# Patient Record
Sex: MALE | Race: WHITE | NOT HISPANIC OR LATINO | Employment: OTHER | URBAN - METROPOLITAN AREA
[De-identification: names, ages, dates, MRNs, and addresses within clinical notes are randomized per-mention and may not be internally consistent; named-entity substitution may affect disease eponyms.]

---

## 2021-06-14 ENCOUNTER — OFFICE VISIT (OUTPATIENT)
Dept: SLEEP MEDICINE | Facility: HOSPITAL | Age: 46
End: 2021-06-14

## 2021-06-14 VITALS
SYSTOLIC BLOOD PRESSURE: 144 MMHG | OXYGEN SATURATION: 97 % | BODY MASS INDEX: 40.32 KG/M2 | WEIGHT: 297.7 LBS | HEIGHT: 72 IN | DIASTOLIC BLOOD PRESSURE: 82 MMHG | HEART RATE: 73 BPM

## 2021-06-14 DIAGNOSIS — E66.01 OBESITY, MORBID, BMI 40.0-49.9 (HCC): ICD-10-CM

## 2021-06-14 DIAGNOSIS — R06.83 SNORING: ICD-10-CM

## 2021-06-14 DIAGNOSIS — G47.33 OSA (OBSTRUCTIVE SLEEP APNEA): Primary | ICD-10-CM

## 2021-06-14 DIAGNOSIS — G47.19 EXCESSIVE DAYTIME SLEEPINESS: ICD-10-CM

## 2021-06-14 PROBLEM — J30.2 SEASONAL RHINITIS: Status: ACTIVE | Noted: 2021-06-14

## 2021-06-14 PROCEDURE — 99204 OFFICE O/P NEW MOD 45 MIN: CPT | Performed by: INTERNAL MEDICINE

## 2021-06-14 RX ORDER — MELOXICAM 15 MG/1
TABLET ORAL
COMMUNITY
Start: 2021-04-26

## 2021-06-14 RX ORDER — FLUTICASONE PROPIONATE 50 MCG
SPRAY, SUSPENSION (ML) NASAL
COMMUNITY
Start: 2021-04-26

## 2021-06-14 RX ORDER — MONTELUKAST SODIUM 10 MG/1
TABLET ORAL
COMMUNITY
Start: 2021-04-26

## 2021-06-14 NOTE — PROGRESS NOTES
"  Jennifer Negrete is a 45 y.o. male.   Chief Complaint   Patient presents with   • Sleeping Problem       HPI     45 y.o. male seen for the above    He has a multiple year history of snoring.  His wife indicated the problem has been going on for 3 or 4 years but he clearly notes that he had some type of overnight study 7 years ago.  This was some type of home study and could have been an overnight oximetry.  He does not recall any other specifics.  He was told that it was okay.    He has begun noticing daytime somnolence.  He will not offer the first time in his life at times.  He will sometimes awaken himself with difficulty breathing and also has started awakening with morning headaches.    His wife notes snoring as well as apneas.  She notes that he snores primarily in his back and his right side.  She notes no other unusual activities at night.    He has gained about 15-20 pounds in the last several years.    He recalls awakening twice per night on average.    [place] Scale is: 6/24    The patient's relevant past medical, surgical, family, and social history reviewed and updated in Epic as appropriate.    Current medications are:   Current Outpatient Medications:   •  fluticasone (FLONASE) 50 MCG/ACT nasal spray, , Disp: , Rfl:   •  meloxicam (MOBIC) 15 MG tablet, , Disp: , Rfl:   •  montelukast (SINGULAIR) 10 MG tablet, , Disp: , Rfl: .    Review of Systems    Review of Systems  ROS documented in patient questionnaire ×14 systems.  Reviewed with patient.  Otherwise negative except as noted in HPI.    Physical Exam    Blood pressure 144/82, pulse 73, height 182.9 cm (72\"), weight 135 kg (297 lb 11.2 oz), SpO2 97 %. Body mass index is 40.38 kg/m².    Physical Exam  Vitals and nursing note reviewed.   Constitutional:       Appearance: Normal appearance. He is well-developed.   HENT:      Head: Normocephalic and atraumatic.      Nose: Nose normal.      Mouth/Throat:      Mouth: Mucous membranes are moist.      " Pharynx: Oropharynx is clear. No oropharyngeal exudate.      Comments: Class IV airway  Eyes:      General: No scleral icterus.     Conjunctiva/sclera: Conjunctivae normal.   Neck:      Thyroid: No thyromegaly.      Trachea: No tracheal deviation.   Cardiovascular:      Rate and Rhythm: Normal rate and regular rhythm.      Heart sounds: No murmur heard.   No friction rub. No gallop.    Pulmonary:      Effort: Pulmonary effort is normal. No respiratory distress.      Breath sounds: No wheezing or rales.   Musculoskeletal:         General: No deformity. Normal range of motion.   Skin:     General: Skin is warm and dry.      Findings: No rash.   Neurological:      Mental Status: He is alert and oriented to person, place, and time.   Psychiatric:         Behavior: Behavior normal.         Thought Content: Thought content normal.         DATA:    Reviewed note from Fabricio Snell MD dated 11/28/2016    Reviewed bed partner questionnaire    ASSESSMENT:    Problem List Items Addressed This Visit        Pulmonary Problems    NILDA (obstructive sleep apnea) - Primary    Relevant Orders    Home Sleep Study    Snoring    Relevant Orders    Home Sleep Study       Other    Excessive daytime sleepiness    Relevant Orders    Home Sleep Study    Obesity, morbid, BMI 40.0-49.9 (CMS/[facility])          45-year-old male with increasing excessive daytime somnolence, morning headaches, snoring, and witnessed apneas.  He likely has undiagnosed obstructive sleep apnea.  He is at risk from the standpoint of his Mallampati class IV airway and BMI of 40.    PLAN:    1. Home sleep apnea testing is appropriate in his case  2. I discussed the diagnostic process as well as treatment options  3. I went over the association between untreated obstructive sleep apnea and long-term problems with metabolic and cardiovascular disease  4. We discussed the benefits of long-term weight loss  5. He was amenable to trial of CPAP therapy if deemed appropriate and  he is familiar with this therapy.  6. Close sleep center follow-up    I have reviewed the results of my evaluation and impression and discussed my recommendations in detail with the patient.    Level of Risk Moderate due to: undiagnosed new problem    Signed by  Beck Clements MD    June 14, 2021      CC: Provider, No Known          No ref. provider found

## 2021-07-12 ENCOUNTER — HOSPITAL ENCOUNTER (OUTPATIENT)
Dept: SLEEP MEDICINE | Facility: HOSPITAL | Age: 46
Discharge: HOME | End: 2021-07-12
Admitting: INTERNAL MEDICINE

## 2021-07-12 VITALS — BODY MASS INDEX: 40.23 KG/M2 | HEIGHT: 72 IN | WEIGHT: 297 LBS

## 2021-07-12 DIAGNOSIS — G47.33 OSA (OBSTRUCTIVE SLEEP APNEA): ICD-10-CM

## 2021-07-12 DIAGNOSIS — G47.19 EXCESSIVE DAYTIME SLEEPINESS: ICD-10-CM

## 2021-07-12 DIAGNOSIS — R06.83 SNORING: ICD-10-CM

## 2021-07-12 PROCEDURE — 95800 SLP STDY UNATTENDED: CPT

## 2021-07-12 PROCEDURE — 95800 SLP STDY UNATTENDED: CPT | Performed by: INTERNAL MEDICINE

## 2021-07-14 DIAGNOSIS — G47.33 OSA (OBSTRUCTIVE SLEEP APNEA): Primary | ICD-10-CM

## 2021-07-14 DIAGNOSIS — J30.2 SEASONAL ALLERGIC RHINITIS, UNSPECIFIED TRIGGER: ICD-10-CM

## 2021-07-14 DIAGNOSIS — G47.19 EXCESSIVE DAYTIME SLEEPINESS: ICD-10-CM

## 2021-07-14 DIAGNOSIS — E66.01 OBESITY, MORBID, BMI 40.0-49.9 (HCC): ICD-10-CM

## 2021-07-14 DIAGNOSIS — R06.83 SNORING: ICD-10-CM

## 2021-07-14 NOTE — PROGRESS NOTES
CALLED PATIENT TO ADVISE OF STUDY. REACHED VM AND LEFT MESSAGE REQUESTING RETURN CALL 07/14/21 TRC

## 2022-01-11 ENCOUNTER — HOSPITAL ENCOUNTER (OUTPATIENT)
Dept: GENERAL RADIOLOGY | Facility: HOSPITAL | Age: 47
Discharge: HOME | End: 2022-01-11
Admitting: FAMILY MEDICINE

## 2022-01-11 ENCOUNTER — TRANSCRIBE ORDERS (OUTPATIENT)
Dept: ADMINISTRATIVE | Facility: HOSPITAL | Age: 47
End: 2022-01-11

## 2022-01-11 DIAGNOSIS — J20.9 ACUTE BRONCHITIS, UNSPECIFIED ORGANISM: ICD-10-CM

## 2022-01-11 DIAGNOSIS — J20.9 ACUTE BRONCHITIS, UNSPECIFIED ORGANISM: Primary | ICD-10-CM

## 2022-01-11 PROCEDURE — 71046 X-RAY EXAM CHEST 2 VIEWS: CPT

## 2022-04-20 ENCOUNTER — OFFICE VISIT (OUTPATIENT)
Dept: SLEEP MEDICINE | Facility: HOSPITAL | Age: 47
End: 2022-04-20

## 2022-04-20 VITALS
HEIGHT: 72 IN | OXYGEN SATURATION: 98 % | WEIGHT: 272.8 LBS | DIASTOLIC BLOOD PRESSURE: 71 MMHG | HEART RATE: 84 BPM | BODY MASS INDEX: 36.95 KG/M2 | SYSTOLIC BLOOD PRESSURE: 148 MMHG

## 2022-04-20 DIAGNOSIS — G47.33 OSA (OBSTRUCTIVE SLEEP APNEA): Primary | ICD-10-CM

## 2022-04-20 PROCEDURE — 99213 OFFICE O/P EST LOW 20 MIN: CPT | Performed by: NURSE PRACTITIONER

## 2022-04-20 NOTE — PROGRESS NOTES
Chief Complaint:   Chief Complaint   Patient presents with   • Follow-up       HPI:    Jennifer Negrete is a 46 y.o. male here for follow-up of sleep apnea.  Patient was seen in consult 6/14/2021 for excessive daytime sleepiness, morning headaches, snoring, and has awakened with difficulty breathing in the past.  He had a sleep study 7/12/2021 that did show moderate obstructive sleep apnea and he did wish to initiate CPAP therapy.  Patient states he was set up with DME in [place].  Patient states after the initial phone call he never heard anything back and when he reached out to them they told him there was a recall and they had no machines.  This is the only information patient received.  He is now back today for a new face-to-face so that we may resubmit another order.  Patient does sleep 6 hours nightly and goes to sleep easily.  He does get up x2 in the night.  Patient states he is never rested.  He has an [place] score of 12/24.  We will get an order sent in today for a new DME and see him back accordingly.        Current medications are:   Current Outpatient Medications:   •  fluticasone (FLONASE) 50 MCG/ACT nasal spray, , Disp: , Rfl:   •  montelukast (SINGULAIR) 10 MG tablet, , Disp: , Rfl:   •  meloxicam (MOBIC) 15 MG tablet, , Disp: , Rfl: .      The patient's relevant past medical, surgical, family and social history were reviewed and updated in Epic as appropriate.       Review of Systems   Eyes: Positive for visual disturbance.   Respiratory: Positive for apnea.    Allergic/Immunologic: Positive for environmental allergies.   Psychiatric/Behavioral: Positive for sleep disturbance.   All other systems reviewed and are negative.        Objective:    Physical Exam  Constitutional:       Appearance: Normal appearance.   HENT:      Head: Normocephalic and atraumatic.      Mouth/Throat:      Comments: Mallampati 4 anatomy  Cardiovascular:      Rate and Rhythm: Normal rate and regular rhythm.   Pulmonary:       Effort: Pulmonary effort is normal.      Breath sounds: Normal breath sounds.   Skin:     General: Skin is warm and dry.   Neurological:      Mental Status: He is alert and oriented to person, place, and time.   Psychiatric:         Mood and Affect: Mood normal.         Behavior: Behavior normal.         Thought Content: Thought content normal.         Judgment: Judgment normal.           ASSESSMENT/PLAN    Diagnoses and all orders for this visit:    1. NILDA (obstructive sleep apnea) (Primary)  -     PAP Therapy            1. Counseled patient regarding multimodal approach with healthy nutrition, healthy sleep, regular physical activity, social activities, counseling, and medications. Encouraged to practice lateral sleep position. Avoid alcohol and sedatives close to bedtime.  2. Order to initiate CPAP therapy sent to [facility] settings 8-18 I will see patient back in 31 to 90 days.    I have reviewed the results of my evaluation and impression and discussed my recommendations in detail with the patient.      Signed by  DANNA Dyer    April 20, 2022      CC: Fabricio Snell MD          No ref. provider found

## 2022-10-31 ENCOUNTER — OFFICE VISIT (OUTPATIENT)
Dept: SLEEP MEDICINE | Facility: HOSPITAL | Age: 47
End: 2022-10-31

## 2022-10-31 VITALS
HEIGHT: 72 IN | SYSTOLIC BLOOD PRESSURE: 155 MMHG | HEART RATE: 96 BPM | OXYGEN SATURATION: 96 % | DIASTOLIC BLOOD PRESSURE: 84 MMHG | BODY MASS INDEX: 35.11 KG/M2 | WEIGHT: 259.2 LBS

## 2022-10-31 DIAGNOSIS — G47.33 OSA (OBSTRUCTIVE SLEEP APNEA): Primary | ICD-10-CM

## 2022-10-31 PROCEDURE — 99213 OFFICE O/P EST LOW 20 MIN: CPT | Performed by: NURSE PRACTITIONER

## 2022-10-31 NOTE — PROGRESS NOTES
Chief Complaint:   Chief Complaint   Patient presents with   • Sleep Apnea     F/U       HPI:    Jennifer Negrete is a 46 y.o. male here for follow-up of sleep apnea.  Patient was last seen 4/20/2022.  Patient had not received his machine on his last visit but did receive shortly after.  He is here today for compliance visit.  Patient states he is doing well, sleeping 6 hours nightly and does feel refreshed upon awakening.  He goes to sleep easily and no longer gets up in the night.  Patient has an [place] score of 6/24.  He has no concerns or complaints and wishes to continue CPAP therapy.        Current medications are:   Current Outpatient Medications:   •  fluticasone (FLONASE) 50 MCG/ACT nasal spray, , Disp: , Rfl:   •  meloxicam (MOBIC) 15 MG tablet, , Disp: , Rfl:   •  montelukast (SINGULAIR) 10 MG tablet, , Disp: , Rfl: .      The patient's relevant past medical, surgical, family and social history were reviewed and updated in Epic as appropriate.       Review of Systems   HENT: Positive for congestion.    Eyes: Positive for visual disturbance.   Respiratory: Positive for apnea.    Allergic/Immunologic: Positive for environmental allergies.   Psychiatric/Behavioral: Positive for sleep disturbance.   All other systems reviewed and are negative.        Objective:    Physical Exam  Constitutional:       Appearance: Normal appearance.   HENT:      Head: Atraumatic.      Mouth/Throat:      Comments: Mallampati 4 anatomy  Cardiovascular:      Rate and Rhythm: Normal rate and regular rhythm.   Pulmonary:      Effort: Pulmonary effort is normal.      Breath sounds: Normal breath sounds.   Skin:     General: Skin is warm and dry.   Neurological:      Mental Status: He is alert and oriented to person, place, and time.   Psychiatric:         Mood and Affect: Mood normal.         Behavior: Behavior normal.         Thought Content: Thought content normal.         Judgment: Judgment normal.       /84   Pulse 96   Ht  "182.9 cm (72\")   Wt 118 kg (259 lb 3.2 oz)   SpO2 96%   BMI 35.15 kg/m²     CPAP Report  22/30 days of use  Greater than 4-hour use 73%  Settings 8-18  AHI of 1.1  95th percentile pressure 10.1    The patient continues to use and benefit from CPAP therapy.    ASSESSMENT/PLAN    Diagnoses and all orders for this visit:    1. NILDA (obstructive sleep apnea) (Primary)  -     PAP Therapy        1. Counseled patient regarding multimodal approach with healthy nutrition, healthy sleep, regular physical activity, social activities, counseling, and medications. Encouraged to practice lateral sleep position. Avoid alcohol and sedatives close to bedtime.  2. Refill supplies x1 year.  Return to clinic 1 year or sooner symptoms warrant.      I have reviewed the results of my evaluation and impression and discussed my recommendations in detail with the patient.      Signed by  DANNA Dyer    October 31, 2022      CC: Fabricio Snell MD         No ref. provider found      "

## 2022-12-09 ENCOUNTER — TELEPHONE (OUTPATIENT)
Dept: SLEEP MEDICINE | Facility: HOSPITAL | Age: 47
End: 2022-12-09

## 2022-12-09 NOTE — TELEPHONE ENCOUNTER
Provider: VIRGEN JOHN    Caller: JOLENE ORDOÑEZ    Relationship to Patient: PT WIFE    Phone Number: [phone]    Reason for Call: WIFE CALLED BECAUSE PT HAS BEEN SICK THIS YEAR W/BRONCHITIS BUT THE PCP RECOMMEND PT SEE A PULMONOLOGIST.  PT/WIFE CALLED TO SCHEDULE AN APPOINTMENT BUT WAS TOLD TO HAVE SLEEP MEDICINE SEND A REFERRAL.    PLEASE CALL TO DISCUSS.

## 2023-10-30 ENCOUNTER — OFFICE VISIT (OUTPATIENT)
Dept: SLEEP MEDICINE | Facility: CLINIC | Age: 48
End: 2023-10-30
Payer: COMMERCIAL

## 2023-10-30 VITALS
TEMPERATURE: 98 F | HEART RATE: 77 BPM | HEIGHT: 72 IN | BODY MASS INDEX: 35.28 KG/M2 | DIASTOLIC BLOOD PRESSURE: 78 MMHG | WEIGHT: 260.5 LBS | SYSTOLIC BLOOD PRESSURE: 122 MMHG | OXYGEN SATURATION: 97 %

## 2023-10-30 DIAGNOSIS — G47.33 OSA (OBSTRUCTIVE SLEEP APNEA): Primary | ICD-10-CM

## 2023-10-30 PROCEDURE — 99213 OFFICE O/P EST LOW 20 MIN: CPT | Performed by: NURSE PRACTITIONER

## 2023-10-30 NOTE — PROGRESS NOTES
Chief Complaint:   Chief Complaint   Patient presents with    Follow-up       HPI:    Jennifer Negrete is a 47 y.o. male here for follow-up of sleep apnea.  Patient was last seen 10/31/2022.  Patient continues to do well with CPAP therapy.  Patient is sleeping 6 to 7 hours nightly and does feel rested upon awakening.  Patient goes to sleep easily and does not get up during the night.  Patient has an [place] score of 5/24.  Patient has no concerns or complaints and will continue therapy.        Current medications are:   Current Outpatient Medications:     fluticasone (FLONASE) 50 MCG/ACT nasal spray, , Disp: , Rfl:     meloxicam (MOBIC) 15 MG tablet, , Disp: , Rfl:     montelukast (SINGULAIR) 10 MG tablet, , Disp: , Rfl: .      The patient's relevant past medical, surgical, family and social history were reviewed and updated in Epic as appropriate.       Review of Systems   Eyes:  Positive for visual disturbance.   Respiratory:  Positive for apnea.    Allergic/Immunologic: Positive for environmental allergies.   Psychiatric/Behavioral:  Positive for sleep disturbance.    All other systems reviewed and are negative.        Objective:    Physical Exam  Constitutional:       Appearance: Normal appearance.   HENT:      Head: Normocephalic and atraumatic.      Mouth/Throat:      Comments: Mallampati 4 anatomy  Cardiovascular:      Rate and Rhythm: Normal rate and regular rhythm.   Pulmonary:      Effort: Pulmonary effort is normal.      Breath sounds: Normal breath sounds.   Skin:     General: Skin is warm and dry.   Neurological:      Mental Status: He is alert and oriented to person, place, and time.   Psychiatric:         Mood and Affect: Mood normal.         Behavior: Behavior normal.         Thought Content: Thought content normal.         Judgment: Judgment normal.         CPAP Report  Average use 6 hours 31 minutes  AHI of 0.6  Greater than 4-hour use 86.6  26 out of the last 30 days of use    The patient continues to  use and benefit from CPAP therapy.    ASSESSMENT/PLAN    Diagnoses and all orders for this visit:    1. NILDA (obstructive sleep apnea) (Primary)  -     PAP Therapy        Counseled patient regarding multimodal approach with healthy nutrition, healthy sleep, regular physical activity, social activities, counseling, and medications. Encouraged to practice lateral sleep position. Avoid alcohol and sedatives close to bedtime.  Refill supplies x1 year.  Return to clinic 1 year sooner symptoms warrant.      I have reviewed the results of my evaluation and impression and discussed my recommendations in detail with the patient.      Signed by  DANNA Dyer    October 30, 2023      CC: Fabricio Snell MD         No ref. provider found

## 2024-10-31 ENCOUNTER — TELEMEDICINE (OUTPATIENT)
Dept: SLEEP MEDICINE | Facility: CLINIC | Age: 49
End: 2024-10-31
Payer: COMMERCIAL

## 2024-10-31 VITALS — HEIGHT: 72 IN | WEIGHT: 270 LBS | BODY MASS INDEX: 36.57 KG/M2

## 2024-10-31 DIAGNOSIS — G47.33 OSA (OBSTRUCTIVE SLEEP APNEA): Primary | ICD-10-CM

## 2024-10-31 PROCEDURE — 99213 OFFICE O/P EST LOW 20 MIN: CPT | Performed by: NURSE PRACTITIONER

## 2024-10-31 NOTE — PROGRESS NOTES
"Chief Complaint:   Chief Complaint   Patient presents with    Follow-up       HPI:    Jennifer Negrete is a 48 y.o. male here for follow-up of sleep apnea.  Patient was last seen 10/30/2023.  Patient continues to do well with CPAP therapy.  Patient sleeps 6 to 7 hours nightly and feels rested upon awakening.  Patient goes to sleep easily and does not get up during the night.  Patient has an [place] score of 5/24.  Patient does well with nasal mask and standard tubing.  Patient has no concerns or complaints and will continue therapy.        Current medications are:   Current Outpatient Medications:     fluticasone (FLONASE) 50 MCG/ACT nasal spray, , Disp: , Rfl:     meloxicam (MOBIC) 15 MG tablet, , Disp: , Rfl:     montelukast (SINGULAIR) 10 MG tablet, , Disp: , Rfl: .      The patient's relevant past medical, surgical, family and social history were reviewed and updated in Epic as appropriate.       Review of Systems   Eyes:  Positive for visual disturbance.   Respiratory:  Positive for apnea.    Allergic/Immunologic: Positive for environmental allergies.   Psychiatric/Behavioral:  Positive for sleep disturbance.    All other systems reviewed and are negative.        Objective:    Physical Exam  Constitutional:       Appearance: Normal appearance.   HENT:      Head: Normocephalic and atraumatic.      Mouth/Throat:      Comments: Mallampati 4 anatomy  Pulmonary:      Effort: Pulmonary effort is normal. No respiratory distress.   Neurological:      Mental Status: He is alert and oriented to person, place, and time.   Psychiatric:         Mood and Affect: Mood normal.         Behavior: Behavior normal.         Thought Content: Thought content normal.         Judgment: Judgment normal.     Ht 182.9 cm (72\")   Wt 122 kg (270 lb)   BMI 36.62 kg/m²       CPAP Report    76/90 days of use  Greater than 4-hour use 77%  Setting 8-18  95th percentile pressure 10.0  AHI of 0.6  The patient continues to use and benefit from CPAP " therapy.    ASSESSMENT/PLAN    Diagnoses and all orders for this visit:    1. NILDA (obstructive sleep apnea) (Primary)  -     PAP Therapy        Refill supplies x 1 year.  Return to clinic 1 year sooner symptoms warrant.  The [facility]. The patient presents today for telehealth service.  This service was conducted via audio/video technology through a secure [employer] video visit connection through Epic.  This provider is located in [facility].  Patient stated they are in a secure environment for the session.  Patient's condition being diagnosed/treated is appropriate for telemedicine.  The provider identified himself as well as his credentials.  The patient, and/or patient's guardian, consent to be seen remotely, and when consent is given they understanding that the consent allows for patient identifiable information to be sent to a third-party as needed.  They may refuse to be seen remotely at any time.  The electronic data is encrypted and password protected, and the patient and/or guardian has been advised of the potential risk to privacy not withstanding such measures.  Patient identifiers used: Name and date of birth.   I have reviewed the results of my evaluation and impression and discussed my recommendations in detail with the patient.      Signed by  Krystyna Michael, DANNA    October 31, 2024      CC: Fabricio Snell MD         No ref. provider found